# Patient Record
Sex: MALE | Race: WHITE | URBAN - METROPOLITAN AREA
[De-identification: names, ages, dates, MRNs, and addresses within clinical notes are randomized per-mention and may not be internally consistent; named-entity substitution may affect disease eponyms.]

---

## 2023-12-05 ENCOUNTER — OFFICE VISIT (OUTPATIENT)
Dept: OTOLARYNGOLOGY | Facility: CLINIC | Age: 38
End: 2023-12-05

## 2023-12-05 ENCOUNTER — CLINICAL SUPPORT (OUTPATIENT)
Dept: AUDIOLOGY | Facility: CLINIC | Age: 38
End: 2023-12-05

## 2023-12-05 VITALS — TEMPERATURE: 98.1 F | WEIGHT: 156 LBS | HEIGHT: 69 IN | BODY MASS INDEX: 23.11 KG/M2

## 2023-12-05 DIAGNOSIS — M43.6 NECK STIFFNESS: ICD-10-CM

## 2023-12-05 DIAGNOSIS — R51.9 NONINTRACTABLE HEADACHE, UNSPECIFIED CHRONICITY PATTERN, UNSPECIFIED HEADACHE TYPE: ICD-10-CM

## 2023-12-05 DIAGNOSIS — M54.2 NECK PAIN: ICD-10-CM

## 2023-12-05 DIAGNOSIS — R42 DIZZINESS: Primary | ICD-10-CM

## 2023-12-05 DIAGNOSIS — R42 VERTIGO: Primary | ICD-10-CM

## 2023-12-05 PROCEDURE — 99204 OFFICE O/P NEW MOD 45 MIN: CPT | Performed by: NURSE PRACTITIONER

## 2023-12-05 PROCEDURE — 92557 COMPREHENSIVE HEARING TEST: CPT | Performed by: AUDIOLOGIST

## 2023-12-05 PROCEDURE — 1036F TOBACCO NON-USER: CPT | Performed by: NURSE PRACTITIONER

## 2023-12-05 PROCEDURE — 92550 TYMPANOMETRY & REFLEX THRESH: CPT | Performed by: AUDIOLOGIST

## 2023-12-05 ASSESSMENT — PATIENT HEALTH QUESTIONNAIRE - PHQ9
1. LITTLE INTEREST OR PLEASURE IN DOING THINGS: NOT AT ALL
2. FEELING DOWN, DEPRESSED OR HOPELESS: NOT AT ALL
SUM OF ALL RESPONSES TO PHQ9 QUESTIONS 1 AND 2: 0

## 2023-12-05 NOTE — PROGRESS NOTES
"Subjective   Patient ID: Phillip Terrazas is a 38 y.o. male who presents for Vertigo.  HPI  This patient is referred for evaluation of  episodic  vertiginous  positional dizziness. The patient is  accompanied by his wife. The approximate duration of his complaints is 4 months, but describes having episodes approximately 5 years ago that resolved with in office \"manipulation\" with an outside ENT.  The patient describes his dizziness as spinning, will shaking/dropping, eyes floating sensation.  Symptoms seem to occur upon waking and can cause nausea and vomiting.  When asked about ear pain, headache, phono-photophobia, visual or motion intolerance, sound or pressure induced symptoms, hearing loss, discharge from ear, tinnitus, aural fullness or autophony, the patient admits to headaches (no diagnosed history of migraine but describes a few headaches throughout his lifetime preceded by aura), lifelong motion intolerance, visual intolerance.  He endorses chronic neck pain and stiffness.  He has been seen by a chiropractor.  Patient and family report x-ray showing arthritis of the cervical spine.  He is not sure if seeing the chiropractor helped his dizziness or not.  Patient is unsure of how long he would experience vertigo because he does not remain in 1 position.  When the vertigo is at its worst, any type of movement triggers it.    When asked about a significant past otological history including history of prior ear surgery, noise exposure, exposure to ototoxic drugs or agents, and/or family history of hearing loss, the patient admits to occupational noise exposure.    Review of Systems  A comprehensive or 10 points review of the patient´s constitutional, neurological, HEENT, pulmonary, cardiovascular and genito-urinary systems showed only those mentioned in history of present illness.    Objective   Physical Exam  Constitutional: no fever, chills, weight loss or weight gain   General appearance: Appears well, " well-nourished, well groomed. No acute distress.   Communication: Normal communication   Psychiatric: Oriented to person, place and time. Normal mood and affect.   Neurologic: Cranial nerves II-XII grossly intact and symmetric bilaterally.   Head and Face:   Head: Atraumatic with no masses, lesions or scarring.   Face: Normal symmetry, no paralysis, synkinesis or facial tic. No scars or deformities.   TMJ: Normal, no trismus.   Eyes: Conjunctiva not edematous or erythematous   Ears: External inspection of ears with no deformity, scars or masses. Bilateral EACs clear and bilateral TMs intact with no signs of effusions     Neck: Normal appearing, symmetric, trachea midline.   Cardiovascular: Examination of peripheral vascular system shows no clubbing or cyanosis.   Respiratory: No respiratory distress increased work of breathing. Inspection of the chest with symmetric chest expansion and normal respiratory effort.   Skin: No rashes in the head or neck  Bedside occulomotor function assessment for ocular pursuits and saccades, spontaneous nystagmus was normal.  Head thrust negative bilaterally  Romberg unsteady, patient swaying backward but able to compensate  Fukuda normal  Tandem gait normal  [place]-Hallpike negative bilaterally    My interpretation of the audiogram done today is normal hearing with excellent word recognition scores and normal tympanograms bilaterally.    Assessment/Plan     This patient presents for initial evaluation of chronic acquired vertigo, neck pain, neck stiffness, headache.     Reassurance given that otologic exam and audiogram today are both normal.  The physiology of balance control was explained. The likely possible etiologies were reviewed. I believe the patient may have a peripheral vestibular disorder.  We discussed that his symptoms could be caused by BPPV, but his description is more severe than typically seen with BPPV.  He may have a combination of BPPV and migraine making his  symptoms more severe.  We also discussed the possibility of cervicogenic dizziness.  I recommended further evaluation with VNG, vHIT, VEMP testing.  I am happy to discuss those results over the phone after complete.  I also recommended dietary modifications for migraine and referral to neurology.  Patient was given a handout on dietary modifications for migraine as well as BPPV which includes home Epley maneuver.  Patient and family are in agreement with the plan.  All questions were answered to patient and family's satisfaction.      45 minutes was spent on this patient´s visit. More than 50% of that time was spent in counseling regarding the possible etiologies, test results, treatment options and coordinating care.    This note was created using speech recognition transcription software. Despite proofreading, several typographical errors might be present that might affect the meaning of the content. Please call with any questions.

## 2023-12-05 NOTE — PROGRESS NOTES
Chief Complaint   Patient presents with    Dizziness       HISTORY:  Phillip Terrazas, age 38 years, was seen for audiogram in conjunction with otolaryngology appointment on 12/5/2023.  Mr. Terrazas is accompanied to the appointment by his wife.  He states he has been experiencing dizziness for the past five years.  The most recent episode was one month ago.  The notes dizziness with positional movements.  He denies hearing loss, tinnitus, ear pain, ear pressure, middle ear pathology and ear surgery.  He does have a history of noise exposure.    RESULTS:  Prior to testing both external auditory canals were clear and tympanic membranes visualized    Immittance and acoustic reflexes:  Immittance testing yielded TYPE A tympanograms indicating normal middle ear function right ear  Immittance testing yielded TYPE A tympanograms indicating normal middle ear function left ear  Acoustic reflexes were present 500 - 4000 Hz right ear  Acoustic reflexes were present 500 - 4000 Hz left ear    Audiogram:  Normal hearing levels were obtained 250 - 8000 Hz both ears  Speech reception thresholds obtained at 5 dBHL both ears  Speech discrimination scores were 100% at 40 dBHL    Distortion product otoacoustic emissions:  Robust emissions were obtained 4000 -8000 Hz both ears and absent 2000 - 3000 Hz both ears    IMPRESSIONS:  Normal middle ear function noted both ears  Normal acoustic reflexes noted both ears  Normal hearing levels     RECOMMENDATIONS:  1.  Follow up with otolaryngology  2.  Retest hearing levels annually      time: 934 - 950

## 2024-08-07 ENCOUNTER — APPOINTMENT (OUTPATIENT)
Dept: PRIMARY CARE | Facility: CLINIC | Age: 39
End: 2024-08-07

## 2024-08-12 ENCOUNTER — APPOINTMENT (OUTPATIENT)
Dept: PRIMARY CARE | Facility: CLINIC | Age: 39
End: 2024-08-12

## 2024-08-12 VITALS
BODY MASS INDEX: 22.96 KG/M2 | HEIGHT: 69 IN | WEIGHT: 155 LBS | DIASTOLIC BLOOD PRESSURE: 65 MMHG | RESPIRATION RATE: 14 BRPM | HEART RATE: 68 BPM | SYSTOLIC BLOOD PRESSURE: 112 MMHG

## 2024-08-12 DIAGNOSIS — Z00.00 ROUTINE MEDICAL EXAM: Primary | ICD-10-CM

## 2024-08-12 PROCEDURE — 99386 PREV VISIT NEW AGE 40-64: CPT | Performed by: FAMILY MEDICINE

## 2024-08-12 PROCEDURE — 1036F TOBACCO NON-USER: CPT | Performed by: FAMILY MEDICINE

## 2024-08-12 PROCEDURE — 3008F BODY MASS INDEX DOCD: CPT | Performed by: FAMILY MEDICINE

## 2024-08-12 NOTE — PROGRESS NOTES
pt has regular dental visits. no vision problems. no hearing loss.   Lifestyle: healthy diet. no weight concerns. Pt exercises regularly.   He does not use tobacco. He denies alcohol abuse.   Reproductive health: the patient is sexually active.   Safety elements used: seat belt, safe driving habits and smoke detector.   no passive smoke exposure,  chemical abuse, domestic violence, occ anxiety symptoms, no depression symptoms, pt has safe sexual behavior and safe driving habits, no driving violations, no history of DUI and no tuberculosis exposure.      Review of Systems  Constitutional: no chills, no fever and no night sweats.   Eyes: no blurred vision and no eyesight problems.   ENT: no hearing loss, no nasal congestion, no nasal discharge, no hoarseness and no sore throat.   Neck: no mass(es) and no swelling.   Cardiovascular: no chest pain, no intermittent leg claudication, no lower extremity edema, no palpitations and no syncope.   Respiratory: no cough, no shortness of breath during exertion, no shortness of breath at rest and no wheezing.   Gastrointestinal: no abdominal pain, no constipation, no blood in stools, no diarrhea, no melena, no nausea, no rectal pain and no vomiting.   Genitourinary: no dysuria, no change in urinary frequency, no genital lesions, no hematuria, no urinary hesitancy, no incontinence, no nocturia, no local lump, no sexual dysfunction, no testicular pain and no feelings of urinary urgency.   Musculoskeletal: no arthralgias, no back pain, no localized joint pain and no myalgias.   Integumentary: no new skin lesions, no rashes and no skin wound.   Neurological: no difficulty walking, no headache, no limb weakness, no numbness and no tingling.   Psychiatric: no anxiety, no personality change, no depression, no emotional problems, no homicidal thoughts, no anhedonia, no sleep disturbances and no substance use disorders.   Endocrine: no changes in appetite, no deepening of the voice, no  polyuria, no feelings of weakness, no recent weight gain and no recent weight loss.   Hematologic/Lymphatic: no tendency for easy bleeding, no tendency for easy bruising, no recurrent infections and no swollen glands.     Physical Exam  Constitutional: Alert and in no acute distress. Well developed, well nourished.   Head and Face: Head and face: Normal.  Palpation of the face and sinuses: Normal.    Eyes: Normal external exam. Pupils: PERRL and EOMI. Ophthalmoscopic examination: Normal.    Ears, Nose, Mouth, and Throat: External inspection of ears and nose: Normal.  Hearing: Normal.  Nasal mucosa, septum, and turbinates: Normal.  Oropharynx: Normal.    Neck: No neck mass was observed. Supple. Thyroid not enlarged and there were no palpable thyroid nodules.   Cardiovascular: Heart rate and rhythm were normal, normal S1 and S2, no gallops, no murmurs and no pericardial rub. Pedal pulses: Normal. No peripheral edema.   Pulmonary: No respiratory distress. Clear bilateral breath sounds.   Chest wall: Normal.    Abdomen: Soft nontender; no abdominal mass palpated. No organomegaly. No hernias.   Musculoskeletal: Gait and station: Normal. No joint swelling seen, normal movements of all extremities. Range of motion: Normal.  Muscle strength/tone: Normal.    Skin: Normal skin color and pigmentation, normal skin turgor, and no rash. Palpation of skin and subcutaneous tissue: Normal.    Neurologic: Cranial nerves 2-12 grossly intact. Deep tendon reflexes were 2+ and symmetric. Sensation: Normal. Coordination: Normal.    Psychiatric: Judgment and insight: Intact. Alert and oriented x 3. Recent and remote memory: Normal.  Mood and affect: Normal.   Lymphatic: No cervical lymphadenopathy. Palpation of lymph nodes in axillae: Normal.      Unremarkable PE,  Recommend DASH diet and regular exercise. check CBC, CMP, lipid, TSH.  Advise eye exam by an OD yearly and dental exam every 6 months. will monitor lipid and weight yearly.  Recommend safe driving. Recommend sunscreen application if exposed to the sun when UV index is above 2.  recommend Hep C, hep B, HIV test. recommend  Tdap, hepB,  flu, covid  shot. We will call pt regarding lab results. f/u as scheduled.

## 2024-08-13 ENCOUNTER — LAB (OUTPATIENT)
Dept: LAB | Facility: LAB | Age: 39
End: 2024-08-13
Payer: COMMERCIAL

## 2024-08-13 DIAGNOSIS — Z00.00 ROUTINE MEDICAL EXAM: ICD-10-CM

## 2024-08-13 LAB
ALBUMIN SERPL BCP-MCNC: 4.8 G/DL (ref 3.4–5)
ALP SERPL-CCNC: 55 U/L (ref 33–120)
ALT SERPL W P-5'-P-CCNC: 17 U/L (ref 10–52)
ANION GAP SERPL CALC-SCNC: 11 MMOL/L (ref 10–20)
AST SERPL W P-5'-P-CCNC: 18 U/L (ref 9–39)
BILIRUB SERPL-MCNC: 0.8 MG/DL (ref 0–1.2)
BUN SERPL-MCNC: 16 MG/DL (ref 6–23)
CALCIUM SERPL-MCNC: 9.4 MG/DL (ref 8.6–10.3)
CHLORIDE SERPL-SCNC: 103 MMOL/L (ref 98–107)
CHOLEST SERPL-MCNC: 161 MG/DL (ref 0–199)
CHOLESTEROL/HDL RATIO: 3
CO2 SERPL-SCNC: 31 MMOL/L (ref 21–32)
CREAT SERPL-MCNC: 0.87 MG/DL (ref 0.5–1.3)
EGFRCR SERPLBLD CKD-EPI 2021: >90 ML/MIN/1.73M*2
ERYTHROCYTE [DISTWIDTH] IN BLOOD BY AUTOMATED COUNT: 11.9 % (ref 11.5–14.5)
EST. AVERAGE GLUCOSE BLD GHB EST-MCNC: 97 MG/DL
GLUCOSE SERPL-MCNC: 100 MG/DL (ref 74–99)
HBA1C MFR BLD: 5 %
HCT VFR BLD AUTO: 47.2 % (ref 41–52)
HDLC SERPL-MCNC: 53.2 MG/DL
HGB BLD-MCNC: 15.9 G/DL (ref 13.5–17.5)
LDLC SERPL CALC-MCNC: 98 MG/DL
MCH RBC QN AUTO: 29.9 PG (ref 26–34)
MCHC RBC AUTO-ENTMCNC: 33.7 G/DL (ref 32–36)
MCV RBC AUTO: 89 FL (ref 80–100)
NON HDL CHOLESTEROL: 108 MG/DL (ref 0–149)
NRBC BLD-RTO: 0 /100 WBCS (ref 0–0)
PLATELET # BLD AUTO: 263 X10*3/UL (ref 150–450)
POTASSIUM SERPL-SCNC: 4.6 MMOL/L (ref 3.5–5.3)
PROT SERPL-MCNC: 6.8 G/DL (ref 6.4–8.2)
RBC # BLD AUTO: 5.32 X10*6/UL (ref 4.5–5.9)
SODIUM SERPL-SCNC: 140 MMOL/L (ref 136–145)
TRIGL SERPL-MCNC: 51 MG/DL (ref 0–149)
TSH SERPL-ACNC: 1.15 MIU/L (ref 0.44–3.98)
VLDL: 10 MG/DL (ref 0–40)
WBC # BLD AUTO: 5.9 X10*3/UL (ref 4.4–11.3)

## 2024-08-13 PROCEDURE — 80053 COMPREHEN METABOLIC PANEL: CPT

## 2024-08-13 PROCEDURE — 36415 COLL VENOUS BLD VENIPUNCTURE: CPT

## 2024-08-13 PROCEDURE — 83036 HEMOGLOBIN GLYCOSYLATED A1C: CPT

## 2024-08-13 PROCEDURE — 80061 LIPID PANEL: CPT

## 2024-08-13 PROCEDURE — 84443 ASSAY THYROID STIM HORMONE: CPT

## 2024-08-13 PROCEDURE — 85027 COMPLETE CBC AUTOMATED: CPT

## 2024-10-24 ENCOUNTER — TELEPHONE (OUTPATIENT)
Dept: PRIMARY CARE | Facility: CLINIC | Age: 39
End: 2024-10-24
Payer: COMMERCIAL

## 2024-10-24 DIAGNOSIS — R42 DIZZINESS: Primary | ICD-10-CM

## 2024-10-24 RX ORDER — MECLIZINE HYDROCHLORIDE 25 MG/1
25 TABLET ORAL 3 TIMES DAILY PRN
Qty: 15 TABLET | Refills: 0 | Status: SHIPPED | OUTPATIENT
Start: 2024-10-24 | End: 2025-10-24

## 2024-10-28 ENCOUNTER — APPOINTMENT (OUTPATIENT)
Dept: PRIMARY CARE | Facility: CLINIC | Age: 39
End: 2024-10-28
Payer: COMMERCIAL

## 2024-10-28 VITALS — SYSTOLIC BLOOD PRESSURE: 100 MMHG | DIASTOLIC BLOOD PRESSURE: 62 MMHG

## 2024-10-28 DIAGNOSIS — R42 DIZZINESS: Primary | ICD-10-CM

## 2024-10-28 PROCEDURE — 99213 OFFICE O/P EST LOW 20 MIN: CPT | Performed by: FAMILY MEDICINE

## 2024-12-05 ENCOUNTER — APPOINTMENT (OUTPATIENT)
Dept: OTOLARYNGOLOGY | Facility: CLINIC | Age: 39
End: 2024-12-05
Payer: COMMERCIAL

## 2025-02-17 ENCOUNTER — APPOINTMENT (OUTPATIENT)
Dept: PRIMARY CARE | Facility: CLINIC | Age: 40
End: 2025-02-17
Payer: COMMERCIAL

## 2025-02-17 VITALS — SYSTOLIC BLOOD PRESSURE: 122 MMHG | DIASTOLIC BLOOD PRESSURE: 67 MMHG

## 2025-02-17 DIAGNOSIS — M54.2 NECK PAIN: Primary | ICD-10-CM

## 2025-02-17 PROCEDURE — 99213 OFFICE O/P EST LOW 20 MIN: CPT | Performed by: FAMILY MEDICINE

## 2025-02-17 RX ORDER — GABAPENTIN 300 MG/1
300 CAPSULE ORAL NIGHTLY
Qty: 30 CAPSULE | Refills: 1 | Status: SHIPPED | OUTPATIENT
Start: 2025-02-17 | End: 2025-08-16

## 2025-02-17 NOTE — PROGRESS NOTES
Subjective   Patient ID: Phillip Terrazas is a 40 y.o. male who presents for neck pain for many years    HPI   Chronic lneck dull pain was worse lately.  the pain  radiated down to RUE. pt had  BL UE weakness and  numbness. pt controlled BM and bladder well.  no imbalance or falls.     Review of Systems    Objective   /67     Physical Exam  No distress, well groomed, No sclera icterus. normal respiration, lungs: CTA b/l, heart: RRR, mild posterior neck tenderness, decreased strength and sensation at rt hand and to a lesser extent the left hand, good balance  Assessment/Plan   Assessment & Plan  Neck pain  Refer to ortho for eval  Orders:    gabapentin (Neurontin) 300 mg capsule; Take 1 capsule (300 mg) by mouth once daily at bedtime.    Referral to Orthopaedic Surgery; Future

## 2025-03-11 ENCOUNTER — APPOINTMENT (OUTPATIENT)
Dept: ORTHOPEDIC SURGERY | Facility: CLINIC | Age: 40
End: 2025-03-11
Payer: COMMERCIAL

## 2025-03-11 DIAGNOSIS — M54.2 NECK PAIN: ICD-10-CM

## 2025-03-11 DIAGNOSIS — M54.12 CERVICAL RADICULOPATHY: Primary | ICD-10-CM

## 2025-03-11 PROCEDURE — 99203 OFFICE O/P NEW LOW 30 MIN: CPT | Performed by: PHYSICIAN ASSISTANT

## 2025-03-12 NOTE — PROGRESS NOTES
Chief Complaint: Chronic neck pain    HPI: Phillip Terrazas is a 40 y.o. male patient presenting with years of chronic neck pain.  He has pain rating into his right shoulder blade.  He does have pain radiating further down his arms, mostly right with intermittent left arm pain.  He has numbness and tingling in his hands, right worse than left.  He has noticed a recent change in his balance, he feels unsteady when walking.  His handwriting has worsened, he is having difficulty with fine motor skills, and he is dropping things more frequently.  He denies bowel or bladder incontinence.    He has been dealing with vertigo for years, he tried chiropractic care which worsened his neck pain.      No anticoagulations  Negative tobacco use    Review of Systems    All other systems have been reviewed and are negative for complaint. All pertinent positive and negative as listed in history of present illness.    History reviewed. No pertinent past medical history.     History reviewed. No pertinent surgical history.     No Known Allergies     Current Outpatient Medications on File Prior to Visit   Medication Sig Dispense Refill    gabapentin (Neurontin) 300 mg capsule Take 1 capsule (300 mg) by mouth once daily at bedtime. 30 capsule 1    meclizine (Antivert) 25 mg tablet Take 1 tablet (25 mg) by mouth 3 times a day as needed for dizziness. 15 tablet 0     No current facility-administered medications on file prior to visit.            PE:   Const: Well-appearing, well-nourished male in no distress.  Eyes: Normal appearing sclera and conjunctiva, no jaundice, pupils normal in appearance.  Neck: No masses or lymphadenopathy appreciated.  Resp: breathing comfortably, normal respiratory rate rate.  CV: No upper or lower extremity edema.  Musculoskeletal: Normal gait.  Able to heel/toe walk without difficulty.  Cervical ROM is supple.  Strength exam of the upper extremities reveals 5/5 strength in all major muscle groups.  No  intrinsic wasting.  Neuro: Sensation is intact and equal bilaterally. Deep tendon reflexes are normal and symmetric.  No clonus, negative Ndiaye sign, negative Lhermitte's sign  Skin: Intact without any lesions, normal turgor.  Psych: Alert and oriented x3, normal mood and affect.          A/P:     He has subjective symptoms of myelopathy without objective findings on exam.  The plan is to start conservative treatment for his chronic neck pain with radiculopathy.  A referral for physical therapy was provided today.  If he does not have improvement with 6 weeks of physical therapy he should follow-up with me at which time we will consider an MRI of the cervical spine.    **This note was dictated using speech recognition software and was not corrected for spelling or grammatical errors**

## 2025-03-20 ENCOUNTER — EVALUATION (OUTPATIENT)
Dept: PHYSICAL THERAPY | Facility: CLINIC | Age: 40
End: 2025-03-20
Payer: COMMERCIAL

## 2025-03-20 DIAGNOSIS — M54.12 CERVICAL RADICULOPATHY: ICD-10-CM

## 2025-03-20 DIAGNOSIS — M54.2 NECK PAIN: Primary | ICD-10-CM

## 2025-03-20 PROCEDURE — 97012 MECHANICAL TRACTION THERAPY: CPT | Mod: GP

## 2025-03-20 PROCEDURE — 97162 PT EVAL MOD COMPLEX 30 MIN: CPT | Mod: GP

## 2025-03-20 ASSESSMENT — ENCOUNTER SYMPTOMS
OCCASIONAL FEELINGS OF UNSTEADINESS: 0
DEPRESSION: 0
LOSS OF SENSATION IN FEET: 0

## 2025-03-20 ASSESSMENT — PATIENT HEALTH QUESTIONNAIRE - PHQ9
2. FEELING DOWN, DEPRESSED OR HOPELESS: NOT AT ALL
SUM OF ALL RESPONSES TO PHQ9 QUESTIONS 1 AND 2: 0
1. LITTLE INTEREST OR PLEASURE IN DOING THINGS: NOT AT ALL

## 2025-03-20 ASSESSMENT — PAIN - FUNCTIONAL ASSESSMENT: PAIN_FUNCTIONAL_ASSESSMENT: 0-10

## 2025-03-20 ASSESSMENT — PAIN SCALES - GENERAL: PAINLEVEL_OUTOF10: 3

## 2025-03-20 NOTE — PROGRESS NOTES
Physical Therapy    Physical Therapy Evaluation and Treatment      Patient Name: Phillip Terrazas  MRN: [id number]  Today's Date: 3/20/2025  : 1985    Time Entry:   Time Calculation  Start Time: 1530  Stop Time: 1617  Time Calculation (min): 47 min  PT Evaluation Time Entry  PT Evaluation (Moderate) Time Entry: 25    Visit # 1       PT Modalities Time Entry  Mechanical Traction Time Entry: 12           Non-Billable Time  Non-billable time: 10  Non-billable time reason: 10' HP cervical    Assessment:  PT Assessment  PT Assessment Results: Decreased strength, Decreased range of motion, Decreased mobility, Pain  Rehab Prognosis: Good   The patient is a 40 year old male presenting to PT with neck pain, intermittent UE radicular symptoms and cervical ROM deficits.  He will benefit from skilled intervention to address above deficits and progress toward meeting goals.    Plan:  OP PT Plan  Treatment/Interventions: Cryotherapy, Dry needling, Education/ Instruction, Electrical stimulation, Hot pack, Manual therapy, Mechanical traction, Therapeutic exercises  PT Plan: Skilled PT  PT Frequency: 2 times per week  Number of Treatments Authorized: $25 co-pay  Rehab Potential: Good  Plan of Care Agreement: Patient    Current Problem:   1. Neck pain  Referral to Physical Therapy    Follow Up In Physical Therapy      2. Cervical radiculopathy  Referral to Physical Therapy          Subjective    Name &  confirmed by patient.  General:  General  Reason for Referral: neck pain; cervical radiculopathy  Referred By: Carlos GRAY  Patient reports he has been experiencing neck pain for ~1 year NKI.  He states pain can radiate to right scapula & down R UE.  He has noticed R UE weakness over the past year as well.  Cervical FB exacerbates pain.  Pain ranges from 0-7/10.  Will call to schedule follow up with ortho following PT.  The patient's goals are to decrease pain and regain strength/mobility.      Precautions:  Precautions  JESSICA  "Fall Risk Score (The score of 4 or more indicates an increased risk of falling): 0  Precautions Comment: none     Pain:  Pain Assessment  Pain Assessment: 0-10  0-10 (Numeric) Pain Score: 3  Pain Location: Neck     Prior Level of Function:  Prior Function Per Pt/Caregiver Report  Level of [place]: Independent with ADLs and functional transfers  Hand Dominance: Right    Objective   Cervical AROM  FB - 50  BB - unable d/t dizziness  R SB - 24  L SB - 18  R Rot - 55  L Rot - 65     strength (kg force)  R - 74  L - 68    R UE strength  Flexion - 4+/5  Abduction - 4+/5  IR - 4+/5  ER - 4+/5  Biceps - 4+/5  Triceps - 4+/5    Outcome Measures:  Other Measures  Neck Disability Index: 14     Treatments:  Cervical HP - 10'   Cervical mechanical traction - 12'; 11#/0#; 30\"H/10\"R    Goals: (By week 6)  1) Decrease neck pain/UE radicular symptoms to <2/10    2) Patient will demonstrate pain free/full cervical AROM to ease capability to perform daily & work activities    3) Increase R UE strength to 5/5 for lifting heavier objects at work    AMBETTER AUTHORIZATION FORM    Cause of therapy: UNSPECIFIED  Is this surgical?: NON SURGICAL  Is the rehabilitation related to a diagnosis of cancer?: NO  Is the rehab related to a diagnosis of lymphadema?: No  How many body parts: One  head/neck  Number of visits requested? 8  Select the following statement that best describes the patient's presentation: MILD OBJECTIVE AND FUNCTIONAL DEFICITS: SPORADIC SYMPTOMS WITH MINIMAL LOSS OF RANGE OF MOTION, STRENGTH, OR ABILITY TO PERFORM DAILY TASKS.               "

## 2025-04-01 ENCOUNTER — TREATMENT (OUTPATIENT)
Dept: PHYSICAL THERAPY | Facility: CLINIC | Age: 40
End: 2025-04-01
Payer: COMMERCIAL

## 2025-04-01 DIAGNOSIS — M54.2 NECK PAIN: Primary | ICD-10-CM

## 2025-04-01 PROCEDURE — 97012 MECHANICAL TRACTION THERAPY: CPT | Mod: GP

## 2025-04-01 PROCEDURE — 97110 THERAPEUTIC EXERCISES: CPT | Mod: GP

## 2025-04-01 ASSESSMENT — PAIN SCALES - GENERAL: PAINLEVEL_OUTOF10: 4

## 2025-04-01 ASSESSMENT — PAIN - FUNCTIONAL ASSESSMENT: PAIN_FUNCTIONAL_ASSESSMENT: 0-10

## 2025-04-01 NOTE — PROGRESS NOTES
"Physical Therapy    Physical Therapy Treatment      Patient Name: Phillip Terrazas  MRN: [id number]  Today's Date: 2025  : 1985    Time Entry:   Time Calculation  Start Time: 161  Stop Time: 1655  Time Calculation (min): 40 min       Visit # 2    PT Therapeutic Procedures Time Entry  Therapeutic Exercise Time Entry: 15  PT Modalities Time Entry  Mechanical Traction Time Entry: 15           Non-Billable Time  Non-billable time: 10  Non-billable time reason: 10' HP cervical prior to treatment    Assessment:    Patient reports pain decreased to 1/10 following treatment today.      Plan:   Treatment/Interventions: Cryotherapy, Dry needling, Education/ Instruction, Electrical stimulation, Hot pack, Manual therapy, Mechanical traction, Therapeutic exercises     Current Problem:   1. Neck pain  Follow Up In Physical Therapy          Subjective    Name &  confirmed by patient.  General:   Patient reports feeling good for the rest of the day after last treatment.      Precautions:  Precautions  Precautions Comment: none     Pain:  Pain Assessment  Pain Assessment: 0-10  0-10 (Numeric) Pain Score: 4  Pain Location: Neck          Objective     R UE strength  Flexion - 4+/5  Abduction - 4+/5  IR - 4+/5  ER - 4+/5  Biceps - 4+/5  Triceps - 4+/5    Outcome Measures:    Not today    Treatments:  EXERCISES Date 25 Date Date Date    REPS REPS REPS REPS          HP cervical/R UT 10'             Chin tucks X10 10\"H      Cervical SB stretch X3 ea 30\"H             Pulldowns Blue x 20      Rows  Blue x 20      Skis Blue x 20                                                                                                        Cervical mechanical traction ; 15'; 12#/0#; 30\"H/10\"R                                                HEP                 Goals: (By week 6)  1) Decrease neck pain/UE radicular symptoms to <2/10 -progressing    2) Patient will demonstrate pain free/full cervical AROM to ease capability to perform daily " Didn't feel self Wednesday. 100.6 Thursday night. Seen at  Urgent care yesterday. Fever at 101 today, lethargic. Drinking ok. Low appetite.    & work activities    3) Increase R UE strength to 5/5 for lifting heavier objects at work    AMBETTER AUTHORIZATION FORM    Cause of therapy: UNSPECIFIED  Is this surgical?: NON SURGICAL  Is the rehabilitation related to a diagnosis of cancer?: NO  Is the rehab related to a diagnosis of lymphadema?: No  How many body parts: One  head/neck  Number of visits requested? 8  Select the following statement that best describes the patient's presentation: MILD OBJECTIVE AND FUNCTIONAL DEFICITS: SPORADIC SYMPTOMS WITH MINIMAL LOSS OF RANGE OF MOTION, STRENGTH, OR ABILITY TO PERFORM DAILY TASKS.

## 2025-04-03 ENCOUNTER — TREATMENT (OUTPATIENT)
Dept: PHYSICAL THERAPY | Facility: CLINIC | Age: 40
End: 2025-04-03
Payer: COMMERCIAL

## 2025-04-03 DIAGNOSIS — M54.2 NECK PAIN: Primary | ICD-10-CM

## 2025-04-03 PROCEDURE — 97110 THERAPEUTIC EXERCISES: CPT | Mod: GP

## 2025-04-03 PROCEDURE — 97012 MECHANICAL TRACTION THERAPY: CPT | Mod: GP

## 2025-04-03 ASSESSMENT — PAIN SCALES - GENERAL: PAINLEVEL_OUTOF10: 6

## 2025-04-03 ASSESSMENT — PAIN - FUNCTIONAL ASSESSMENT: PAIN_FUNCTIONAL_ASSESSMENT: 0-10

## 2025-04-03 NOTE — PROGRESS NOTES
"Physical Therapy    Physical Therapy Treatment      Patient Name: Phillip Terrazas  MRN: [id number]  Today's Date: 4/3/2025  : 1985    Time Entry:   Time Calculation  Start Time: 1750  Stop Time: 1830  Time Calculation (min): 40 min       Visit # 3    PT Therapeutic Procedures Time Entry  Therapeutic Exercise Time Entry: 25  PT Modalities Time Entry  Mechanical Traction Time Entry: 15                Assessment:    Patient 5 minutes late to PT.  Patient reports pain decreased to 3/10 following treatment.        Plan:   Treatment/Interventions: Cryotherapy, Dry needling, Education/ Instruction, Electrical stimulation, Hot pack, Manual therapy, Mechanical traction, Therapeutic exercises     Current Problem:   1. Neck pain  Follow Up In Physical Therapy            Subjective    Name &  confirmed by patient.  General:   Patient reports experiencing less frequent R UE radicular symptoms.      Precautions:  Precautions  Precautions Comment: none     Pain:  Pain Assessment  Pain Assessment: 0-10  0-10 (Numeric) Pain Score: 6  Pain Location: Neck          Objective     R UE strength  Flexion - 4+/5  Abduction - 4+/5    Outcome Measures:    Not today    Treatments:  EXERCISES Date 25 Date 4/3/25 Date Date    REPS REPS REPS REPS          HP cervical/R UT 10' ---            Chin tucks X10 10\"H X10 10\"H     Cervical SB stretch X3 ea 30\"H X3 ea 30\" H            Pulldowns Blue x 20 Gray x 20     Rows  Blue x 20 Briscoe x 20     Skis Blue x 20 Briscoe x 20            PRIMUS ROM  T-114 60\" ea     PRIMUS H P/P  T-201 60\" ea                                                                                  Cervical mechanical traction ; 15'; 12#/0#; 30\"H/10\"R 15'; 13#/0#; 30\"H/10\"R                                               HEP                 Goals: (By week 6)  1) Decrease neck pain/UE radicular symptoms to <2/10 -progressing    2) Patient will demonstrate pain free/full cervical AROM to ease capability to perform daily & work " activities    3) Increase R UE strength to 5/5 for lifting heavier objects at work    AMBETTER AUTHORIZATION FORM    Cause of therapy: UNSPECIFIED  Is this surgical?: NON SURGICAL  Is the rehabilitation related to a diagnosis of cancer?: NO  Is the rehab related to a diagnosis of lymphadema?: No  How many body parts: One  head/neck  Number of visits requested? 8  Select the following statement that best describes the patient's presentation: MILD OBJECTIVE AND FUNCTIONAL DEFICITS: SPORADIC SYMPTOMS WITH MINIMAL LOSS OF RANGE OF MOTION, STRENGTH, OR ABILITY TO PERFORM DAILY TASKS.

## 2025-04-08 ENCOUNTER — TREATMENT (OUTPATIENT)
Dept: PHYSICAL THERAPY | Facility: CLINIC | Age: 40
End: 2025-04-08
Payer: COMMERCIAL

## 2025-04-08 DIAGNOSIS — M54.2 NECK PAIN: Primary | ICD-10-CM

## 2025-04-08 PROCEDURE — 97012 MECHANICAL TRACTION THERAPY: CPT | Mod: GP

## 2025-04-08 PROCEDURE — 97110 THERAPEUTIC EXERCISES: CPT | Mod: GP

## 2025-04-08 ASSESSMENT — PAIN SCALES - GENERAL: PAINLEVEL_OUTOF10: 6

## 2025-04-08 ASSESSMENT — PAIN - FUNCTIONAL ASSESSMENT: PAIN_FUNCTIONAL_ASSESSMENT: 0-10

## 2025-04-08 NOTE — PROGRESS NOTES
"Physical Therapy    Physical Therapy Treatment      Patient Name: Phillip Terrazas  MRN: [id number]  Today's Date: 2025  : 1985    Time Entry:   Time Calculation  Start Time: 161  Stop Time: 1655  Time Calculation (min): 40 min       Visit # 4    PT Therapeutic Procedures Time Entry  Therapeutic Exercise Time Entry: 25  PT Modalities Time Entry  Mechanical Traction Time Entry: 15                Assessment:   Patient reports neck pain decreased to 3/10 following treatment.  Added cervical rotation stretch to HEP.      Plan:   Treatment/Interventions: Cryotherapy, Dry needling, Education/ Instruction, Electrical stimulation, Hot pack, Manual therapy, Mechanical traction, Therapeutic exercises     Current Problem:   1. Neck pain  Follow Up In Physical Therapy          Subjective    Name &  confirmed by patient.  General:  Patient reports experiencing no UE radicular symptoms today.      Precautions:  Precautions  Precautions Comment: none     Pain:  Pain Assessment  Pain Assessment: 0-10  0-10 (Numeric) Pain Score: 6  Pain Location: Neck        Objective     R UE strength  Flexion - 4+/5  Abduction - 4+/5    Outcome Measures:    Not today    Treatments:  EXERCISES Date 25 Date 4/3/25 Date 25 Date    REPS REPS REPS REPS          HP cervical/R UT 10' ---            Chin tucks X10 10\"H X10 10\"H X10 10\"H    Cervical SB stretch X3 ea 30\"H X3 ea 30\"H X3 ea 30\"H    Cervical rotation stretch   X3 ea 30\"H    Pulldowns Blue x 20 Gray x 20 Gray x 20    Rows  Blue x 20 Gray x 20 Briscoe x 20    Skis Blue x 20 Briscoe x 20 Gray x 20           PRIMUS ROM  T-114 60\" ea T-114 75\" ea    PRIMUS H P/P  T-201 60\" ea T-201 75\" ea    PRIMUS Gripper   T-69 60\" ea                                                                          Cervical mechanical traction ; 15'; 12#/0#; 30\"H/10\"R 15'; 13#/0#; 30\"H/10\"R 15'; 13#/0#; 30\"H/10\"R                                              HEP                 Goals: (By week 6)  1) Decrease " neck pain/UE radicular symptoms to <2/10 -progressing    2) Patient will demonstrate pain free/full cervical AROM to ease capability to perform daily & work activities    3) Increase R UE strength to 5/5 for lifting heavier objects at work    AMBETTER AUTHORIZATION FORM    Cause of therapy: UNSPECIFIED  Is this surgical?: NON SURGICAL  Is the rehabilitation related to a diagnosis of cancer?: NO  Is the rehab related to a diagnosis of lymphadema?: No  How many body parts: One  head/neck  Number of visits requested? 8  Select the following statement that best describes the patient's presentation: MILD OBJECTIVE AND FUNCTIONAL DEFICITS: SPORADIC SYMPTOMS WITH MINIMAL LOSS OF RANGE OF MOTION, STRENGTH, OR ABILITY TO PERFORM DAILY TASKS.

## 2025-04-10 ENCOUNTER — TREATMENT (OUTPATIENT)
Dept: PHYSICAL THERAPY | Facility: CLINIC | Age: 40
End: 2025-04-10
Payer: COMMERCIAL

## 2025-04-10 DIAGNOSIS — M54.2 NECK PAIN: ICD-10-CM

## 2025-04-10 PROCEDURE — 97110 THERAPEUTIC EXERCISES: CPT | Mod: GP,CQ

## 2025-04-10 PROCEDURE — 97012 MECHANICAL TRACTION THERAPY: CPT | Mod: GP,CQ

## 2025-04-10 ASSESSMENT — PAIN - FUNCTIONAL ASSESSMENT: PAIN_FUNCTIONAL_ASSESSMENT: 0-10

## 2025-04-10 ASSESSMENT — PAIN SCALES - GENERAL: PAINLEVEL_OUTOF10: 6

## 2025-04-10 NOTE — PROGRESS NOTES
"Physical Therapy    Physical Therapy Treatment      Patient Name: Phillip Terrazas  MRN: [id number]  Today's Date: 4/10/2025  : 1985    Time Entry:   Time Calculation  Start Time: 0500  Stop Time: 0540  Time Calculation (min): 40 min       Visit # 5    PT Therapeutic Procedures Time Entry  Therapeutic Exercise Time Entry: 25  PT Modalities Time Entry  Mechanical Traction Time Entry: 15                Assessment:   Pt had decreased neck pain 3-4/10 and tightness at the end of his session.       Plan:   Treatment/Interventions: Cryotherapy, Dry needling, Education/ Instruction, Electrical stimulation, Hot pack, Manual therapy, Mechanical traction, Therapeutic exercises     Current Problem:   1. Neck pain  Follow Up In Physical Therapy          Subjective    Name &  confirmed by patient.  General:  Pt reports increased neck and thoracic pain today. Unsure of cause.    Precautions:   Precautions  Precautions Comment: none     Pain:  Pain Assessment  Pain Assessment: 0-10  0-10 (Numeric) Pain Score: 6  Pain Location: Neck        Objective   R UE strength  Flexion - 4+/5  Abduction - 4+/5    Added doorway pec stretch    Outcome Measures:    Not today    Treatments:  EXERCISES Date 25 Date 4/3/25 Date 25 Date 4/10/25    REPS REPS REPS REPS          HP cervical/R UT 10' ---            Chin tucks X10 10\"H X10 10\"H X10 10\"H X10 10\"H   Cervical SB stretch X3 ea 30\"H X3 ea 30\"H X3 ea 30\"H X3 ea 30\"H   Cervical rotation stretch   X3 ea 30\"H X3 ea 30\"H   Pulldowns Blue x 20 Gray x 20 Gray x 20 Gray x 20   Rows  Blue x 20 Gray x 20 Gray x 20 Briscoe x 20   Skis Blue x 20 Gray x 20 Gray x 20 Gray x 20          PRIMUS ROM  T-114 60\" ea T-114 75\" ea T-114 75\" ea   PRIMUS H P/P  T-201 60\" ea T-201 75\" ea T-201 75\" ea   PRIMUS Gripper   T-69 60\" ea T-69 75\" ea          Door way pec stretch    30\"Hx3                                                           Cervical mechanical traction ; 15'; 12#/0#; 30\"H/10\"R 15'; 13#/0#; " "30\"H/10\"R 15'; 13#/0#; 30\"H/10\"R 15'; 13#/0#; 30\"H/10\"R                                             HEP             Goals: (By week 6)  1) Decrease neck pain/UE radicular symptoms to <2/10 -progressing    2) Patient will demonstrate pain free/full cervical AROM to ease capability to perform daily & work activities    3) Increase R UE strength to 5/5 for lifting heavier objects at work    AMBETTER AUTHORIZATION FORM    Cause of therapy: UNSPECIFIED  Is this surgical?: NON SURGICAL  Is the rehabilitation related to a diagnosis of cancer?: NO  Is the rehab related to a diagnosis of lymphadema?: No  How many body parts: One  head/neck  Number of visits requested? 8  Select the following statement that best describes the patient's presentation: MILD OBJECTIVE AND FUNCTIONAL DEFICITS: SPORADIC SYMPTOMS WITH MINIMAL LOSS OF RANGE OF MOTION, STRENGTH, OR ABILITY TO PERFORM DAILY TASKS.               "

## 2025-04-15 ENCOUNTER — TREATMENT (OUTPATIENT)
Dept: PHYSICAL THERAPY | Facility: CLINIC | Age: 40
End: 2025-04-15
Payer: COMMERCIAL

## 2025-04-15 DIAGNOSIS — M54.2 NECK PAIN: Primary | ICD-10-CM

## 2025-04-15 PROCEDURE — 97110 THERAPEUTIC EXERCISES: CPT | Mod: GP

## 2025-04-15 PROCEDURE — 97012 MECHANICAL TRACTION THERAPY: CPT | Mod: GP

## 2025-04-15 ASSESSMENT — PAIN - FUNCTIONAL ASSESSMENT: PAIN_FUNCTIONAL_ASSESSMENT: 0-10

## 2025-04-15 ASSESSMENT — PAIN SCALES - GENERAL: PAINLEVEL_OUTOF10: 0 - NO PAIN

## 2025-04-15 NOTE — PROGRESS NOTES
"Physical Therapy    Physical Therapy Treatment      Patient Name: Phillip Terrazas  MRN: [id number]  Today's Date: 4/15/2025  : 1985    Time Entry:   Time Calculation  Start Time: 1700  Stop Time: 1750  Time Calculation (min): 50 min       Visit # 6    PT Therapeutic Procedures Time Entry  Therapeutic Exercise Time Entry: 25  PT Modalities Time Entry  Mechanical Traction Time Entry: 15           Non-Billable Time  Non-billable time: 10  Non-billable time reason: 10' cervical HP prior to treatment    Assessment:   Patient reports experiencing no pain before or after treatment.  He states he can experience pain with end range cervical rotation.        Plan:   Treatment/Interventions: Cryotherapy, Dry needling, Education/ Instruction, Electrical stimulation, Hot pack, Manual therapy, Mechanical traction, Therapeutic exercises     Current Problem:   1. Neck pain  Follow Up In Physical Therapy          Subjective    Name &  confirmed by patient.  General:  Patient reports experiencing no pain at rest today.      Precautions:  Precautions  Precautions Comment: none     Pain:  Pain Assessment  Pain Assessment: 0-10  0-10 (Numeric) Pain Score: 0 - No pain        Objective   R UE strength  Flexion - 4+/5  Abduction - 4+/5    Outcome Measures:    Not today    Treatments:  EXERCISES Date 25 Date 4/3/25 Date 25 Date 4/10/25 4/15/25    REPS REPS REPS REPS REPS           HP cervical/R UT 10' ---   10'           Chin tucks X10 10\"H X10 10\"H X10 10\"H X10 10\"H X10 10\"H   Cervical SB stretch X3 ea 30\"H X3 ea 30\"H X3 ea 30\"H X3 ea 30\"H X3 ea 30\"H   Cervical rotation stretch   X3 ea 30\"H X3 ea 30\"H X3 ea 30\"H   Pulldowns Blue x 20 Gray x 20 Gray x 20 Gray x 20 Gray x 20   Rows  Blue x 20 Gray x 20 Gray x 20 Gray x 20 Briscoe x 20   Skis Blue x 20 Gray x 20 Gray x 20 Gray x 20 Gray x 20           PRIMUS ROM  T-114 60\" ea T-114 75\" ea T-114 75\" ea T-114 75\" ea   PRIMUS H P/P  T-201 60\" ea T-201 75\" ea T-201 75\" ea T-201 75\" ea " "  PRIMUS Gripper   T-69 60\" ea T-69 75\" ea T-69 75\" ea           Door way pec stretch    30\"Hx3 30\"H x 3                                                                   Cervical mechanical traction ; 15'; 12#/0#; 30\"H/10\"R 15'; 13#/0#; 30\"H/10\"R 15'; 13#/0#; 30\"H/10\"R 15'; 13#/0#; 30\"H/10\"R 15'; 13#/0#; 30\"H/10\"R                                                   HEP              Goals: (By week 6)  1) Decrease neck pain/UE radicular symptoms to <2/10 -progressing    2) Patient will demonstrate pain free/full cervical AROM to ease capability to perform daily & work activities    3) Increase R UE strength to 5/5 for lifting heavier objects at work    AMBETTER AUTHORIZATION FORM    Cause of therapy: UNSPECIFIED  Is this surgical?: NON SURGICAL  Is the rehabilitation related to a diagnosis of cancer?: NO  Is the rehab related to a diagnosis of lymphadema?: No  How many body parts: One  head/neck  Number of visits requested? 8  Select the following statement that best describes the patient's presentation: MILD OBJECTIVE AND FUNCTIONAL DEFICITS: SPORADIC SYMPTOMS WITH MINIMAL LOSS OF RANGE OF MOTION, STRENGTH, OR ABILITY TO PERFORM DAILY TASKS.               "

## 2025-04-17 ENCOUNTER — APPOINTMENT (OUTPATIENT)
Dept: PHYSICAL THERAPY | Facility: CLINIC | Age: 40
End: 2025-04-17
Payer: COMMERCIAL

## 2025-04-17 ENCOUNTER — DOCUMENTATION (OUTPATIENT)
Dept: PHYSICAL THERAPY | Facility: CLINIC | Age: 40
End: 2025-04-17
Payer: COMMERCIAL

## 2025-04-17 NOTE — PROGRESS NOTES
Physical Therapy                 Therapy Communication Note    Patient Name: Phillip Terrazas  MRN: [id number]  Department:   Room: Room/bed info not found  Today's Date: 4/17/2025     Discipline: Physical Therapy          Missed Visit Reason:      Missed Time: Cancel    Comment: Pt canceled via my chart. Wants to reschedule appointment.

## 2025-04-22 ENCOUNTER — TREATMENT (OUTPATIENT)
Dept: PHYSICAL THERAPY | Facility: CLINIC | Age: 40
End: 2025-04-22
Payer: COMMERCIAL

## 2025-04-22 DIAGNOSIS — M54.2 NECK PAIN: Primary | ICD-10-CM

## 2025-04-22 PROCEDURE — 97012 MECHANICAL TRACTION THERAPY: CPT | Mod: GP

## 2025-04-22 PROCEDURE — 97110 THERAPEUTIC EXERCISES: CPT | Mod: GP

## 2025-04-22 ASSESSMENT — PAIN - FUNCTIONAL ASSESSMENT: PAIN_FUNCTIONAL_ASSESSMENT: 0-10

## 2025-04-22 ASSESSMENT — PAIN SCALES - GENERAL: PAINLEVEL_OUTOF10: 4

## 2025-04-22 NOTE — PROGRESS NOTES
"Physical Therapy    Physical Therapy Treatment      Patient Name: Phillip Terrazas  MRN: [id number]  Today's Date: 2025  : 1985    Time Entry:   Time Calculation  Start Time: 1700  Stop Time: 1750  Time Calculation (min): 50 min       Visit # 7 (7/10)    PT Therapeutic Procedures Time Entry  Therapeutic Exercise Time Entry: 25  PT Modalities Time Entry  Mechanical Traction Time Entry: 15           Non-Billable Time  Non-billable time: 10  Non-billable time reason: 10' cervical HP prior to treatment    Assessment:   Patient reports pain decreased to 3/10 following treatment.        Plan:   Treatment/Interventions: Cryotherapy, Dry needling, Education/ Instruction, Electrical stimulation, Hot pack, Manual therapy, Mechanical traction, Therapeutic exercises     Current Problem:   1. Neck pain  Follow Up In Physical Therapy        Subjective    Name &  confirmed by patient.  General:  Patient reports experiencing no UE radicular symptoms currently.    Precautions:  Precautions  Precautions Comment: none     Pain:  Pain Assessment  Pain Assessment: 0-10  0-10 (Numeric) Pain Score: 4  Pain Location: Neck        Objective   R UE strength  Flexion - 4+/5    Outcome Measures:    Not today    Treatments:  EXERCISES Date 25 Date 4/3/25 Date 25 Date 4/10/25 4/15/25 4/22/25    REPS REPS REPS REPS REPS REPS            HP cervical/R UT 10' ---   10' 10'            Chin tucks X10 10\"H X10 10\"H X10 10\"H X10 10\"H X10 10\"H X10 10\"H   Cervical SB stretch X3 ea 30\"H X3 ea 30\"H X3 ea 30\"H X3 ea 30\"H X3 ea 30\"H X3 ea 30\"H   Cervical rotation stretch   X3 ea 30\"H X3 ea 30\"H X3 ea 30\"H X3 ea 30\"H   Pulldowns Blue x 20 Gray x 20 Gray x 20 Gray x 20 Gray x 20 Gray x 20   Rows  Blue x 20 Gray x 20 Gray x 20 Gray x 20 Gray x 20 Briscoe x 20   Skis Blue x 20 Gray x 20 Gray x 20 Gray x 20 Gray x 20 Gray x 20            PRIMUS ROM  T-114 60\" ea T-114 75\" ea T-114 75\" ea T-114 75\" ea T-114 75\" ea   PRIMUS H P/P  T-201 60\" ea T-201 " "75\" ea T-201 75\" ea T-201 75\" ea T-201 100\" ea   PRIMUS Gripper   T-69 60\" ea T-69 75\" ea T-69 75\" ea T-69 75\" ea            Door way pec stretch    30\"Hx3 30\"H x 3 30\"H x 3                                                                           Cervical mechanical traction ; 15'; 12#/0#; 30\"H/10\"R 15'; 13#/0#; 30\"H/10\"R 15'; 13#/0#; 30\"H/10\"R 15'; 13#/0#; 30\"H/10\"R 15'; 13#/0#; 30\"H/10\"R 15'; 13#/0#; 30\"H/10\"R                                                         HEP               Goals: (By week 6)  1) Decrease neck pain/UE radicular symptoms to <2/10 -progressing    2) Patient will demonstrate pain free/full cervical AROM to ease capability to perform daily & work activities    3) Increase R UE strength to 5/5 for lifting heavier objects at work    AMBETTER AUTHORIZATION FORM    Cause of therapy: UNSPECIFIED  Is this surgical?: NON SURGICAL  Is the rehabilitation related to a diagnosis of cancer?: NO  Is the rehab related to a diagnosis of lymphadema?: No  How many body parts: One  head/neck  Number of visits requested? 8  Select the following statement that best describes the patient's presentation: MILD OBJECTIVE AND FUNCTIONAL DEFICITS: SPORADIC SYMPTOMS WITH MINIMAL LOSS OF RANGE OF MOTION, STRENGTH, OR ABILITY TO PERFORM DAILY TASKS.               "

## 2025-04-24 ENCOUNTER — DOCUMENTATION (OUTPATIENT)
Dept: PHYSICAL THERAPY | Facility: CLINIC | Age: 40
End: 2025-04-24
Payer: COMMERCIAL

## 2025-04-24 ENCOUNTER — APPOINTMENT (OUTPATIENT)
Dept: PHYSICAL THERAPY | Facility: CLINIC | Age: 40
End: 2025-04-24
Payer: COMMERCIAL

## 2025-04-24 DIAGNOSIS — M54.2 NECK PAIN: Primary | ICD-10-CM

## 2025-04-24 NOTE — PROGRESS NOTES
Physical Therapy                 Therapy Communication Note    Patient Name: Phillip Terrazas  MRN: [id number]  Department: [address]  Room: Room/bed info not found  Today's Date: 4/24/2025     Discipline: Physical Therapy          Missed Visit Reason:  spoke with patient and decided to cancel today d/t experiencing dizziness.      Missed Time: Cancel

## 2025-04-30 ENCOUNTER — TREATMENT (OUTPATIENT)
Dept: PHYSICAL THERAPY | Facility: CLINIC | Age: 40
End: 2025-04-30
Payer: COMMERCIAL

## 2025-04-30 DIAGNOSIS — M54.2 NECK PAIN: ICD-10-CM

## 2025-04-30 PROCEDURE — 97110 THERAPEUTIC EXERCISES: CPT | Mod: GP,CQ | Performed by: PHYSICAL THERAPY ASSISTANT

## 2025-04-30 ASSESSMENT — PAIN SCALES - GENERAL: PAINLEVEL_OUTOF10: 6

## 2025-04-30 ASSESSMENT — PAIN - FUNCTIONAL ASSESSMENT: PAIN_FUNCTIONAL_ASSESSMENT: 0-10

## 2025-04-30 NOTE — PROGRESS NOTES
"Physical Therapy    Physical Therapy Treatment      Patient Name: Phillip Terrazas  MRN: [id number]  Today's Date: 2025  : 1985    Time Entry:   Time Calculation  Start Time: 1500  Stop Time: 1551  Time Calculation (min): 51 min       Visit # 8 (8/10)    PT Therapeutic Procedures Time Entry  Therapeutic Exercise Time Entry: 41  PT Modalities Time Entry  Hot/Cold Pack Time Entry: 10                Assessment:   Patient reports pain decreased to 3/10 following treatment.  Reports no issue with vertigo throughout session.       Plan:   Treatment/Interventions: Cryotherapy, Dry needling, Education/ Instruction, Electrical stimulation, Hot pack, Manual therapy, Mechanical traction, Therapeutic exercises     Current Problem:   1. Neck pain  Follow Up In Physical Therapy        Subjective    Name &  confirmed by patient.  General:  Patient reports he kim experienced a bout of vertigo and is still trying to overcome the symptoms. Will hold cervical tractions this date as patient reports he can't lie flat. Reports has been sleeping upright.     Precautions:  Precautions  Precautions Comment: none     Pain:  Pain Assessment  Pain Assessment: 0-10  0-10 (Numeric) Pain Score: 6  Pain Location: Neck        Objective   R UE strength  Flexion - 4+/5    Outcome Measures:    Not today    Treatments:  EXERCISES Date 4/10/25 4/15/25 4/22/25 4/30/25    REPS REPS REPS           HP cervical/R UT  10' 10' 10'          Chin tucks X10 10\"H X10 10\"H X10 10\"H X10 10\"H   Cervical SB stretch X3 ea 30\"H X3 ea 30\"H X3 ea 30\"H X3 ea 30\"H   Cervical rotation stretch X3 ea 30\"H X3 ea 30\"H X3 ea 30\"H X3 ea 30\"H   Pulldowns Gray x 20 Gray x 20 Gray x 20 Briscoe x 20   Rows  Gray x 20 Gray x 20 Gray x 20 Briscoe x 20   Skis Briscoe x 20 Briscoe x 20 Gray x 20 Gray x 20          PRIMUS ROM T-114 75\" ea T-114 75\" ea T-114 75\" ea T-114 75\" ea   PRIMUS H P/P T-201 75\" ea T-201 75\" ea T-201 100\" ea T-201 100\" ea   PRIMUS Gripper T-69 75\" ea T-69 75\"  " "75\" ea T-69 75\" ea          Door way pec stretch 30\"Hx3 30\"H x 3 30\"H x 3 30\"H x 3                                                           Cervical mechanical traction ; 15'; 13#/0#; 30\"H/10\"R 15'; 13#/0#; 30\"H/10\"R 15'; 13#/0#; 30\"H/10\"R Hold due to vertigo                                             HEP             Goals: (By week 6)  1) Decrease neck pain/UE radicular symptoms to <2/10 -progressing    2) Patient will demonstrate pain free/full cervical AROM to ease capability to perform daily & work activities    3) Increase R UE strength to 5/5 for lifting heavier objects at work    AMBETTER AUTHORIZATION FORM    Cause of therapy: UNSPECIFIED  Is this surgical?: NON SURGICAL  Is the rehabilitation related to a diagnosis of cancer?: NO  Is the rehab related to a diagnosis of lymphadema?: No  How many body parts: One  head/neck  Number of visits requested? 8  Select the following statement that best describes the patient's presentation: MILD OBJECTIVE AND FUNCTIONAL DEFICITS: SPORADIC SYMPTOMS WITH MINIMAL LOSS OF RANGE OF MOTION, STRENGTH, OR ABILITY TO PERFORM DAILY TASKS.               "

## 2025-05-06 ENCOUNTER — TREATMENT (OUTPATIENT)
Dept: PHYSICAL THERAPY | Facility: CLINIC | Age: 40
End: 2025-05-06
Payer: COMMERCIAL

## 2025-05-06 DIAGNOSIS — M54.2 NECK PAIN: Primary | ICD-10-CM

## 2025-05-06 PROCEDURE — 97110 THERAPEUTIC EXERCISES: CPT | Mod: GP

## 2025-05-06 PROCEDURE — 97012 MECHANICAL TRACTION THERAPY: CPT | Mod: GP

## 2025-05-06 ASSESSMENT — PAIN SCALES - GENERAL: PAINLEVEL_OUTOF10: 4

## 2025-05-06 ASSESSMENT — PAIN - FUNCTIONAL ASSESSMENT: PAIN_FUNCTIONAL_ASSESSMENT: 0-10

## 2025-05-06 NOTE — PROGRESS NOTES
"Physical Therapy    Physical Therapy Treatment      Patient Name: Phillip Terrazas  MRN: [id number]  Today's Date: 2025  : 1985    Time Entry:   Time Calculation  Start Time: 1530  Stop Time: 1625  Time Calculation (min): 55 min       Visit # 9 (9/10)    PT Therapeutic Procedures Time Entry  Therapeutic Exercise Time Entry: 30  PT Modalities Time Entry  Mechanical Traction Time Entry: 15           Non-Billable Time  Non-billable time: 10  Non-billable time reason: 10' cervical HP    Assessment:   Patient was able to tolerate traction & perform all exercises without experiencing increases in pain or vertigo symptoms.        Plan:   Treatment/Interventions: Cryotherapy, Dry needling, Education/ Instruction, Electrical stimulation, Hot pack, Manual therapy, Mechanical traction, Therapeutic exercises     Current Problem:   1. Neck pain  Follow Up In Physical Therapy          Subjective    Name &  confirmed by patient.  General:  Patient reports vertigo symptoms have improved since last visit.      Precautions:  Precautions  Precautions Comment: none     Pain:  Pain Assessment  Pain Assessment: 0-10  0-10 (Numeric) Pain Score: 4  Pain Location: Neck        Objective   R UE strength  Flexion - 4+/5    Outcome Measures:    Not today    Treatments:  EXERCISES Date 4/10/25 4/15/25 4/22/25 4/30/25 5/6/25    REPS REPS REPS             HP cervical/R UT  10' 10' 10' Cervical HP 10'           Chin tucks X10 10\"H X10 10\"H X10 10\"H X10 10\"H X10 10\"H   Cervical SB stretch X3 ea 30\"H X3 ea 30\"H X3 ea 30\"H X3 ea 30\"H X3 ea 30\"H   Cervical rotation stretch X3 ea 30\"H X3 ea 30\"H X3 ea 30\"H X3 ea 30\"H X3 ea 30\"H   Pulldowns Gray x 20 Gray x 20 Gray x 20 Gray x 20 Briscoe x 20   Rows  Gray x 20 Gray x 20 Gray x 20 Gray x 20 Brisoce x 20   Skis Briscoe x 20 Briscoe x 20 Gray x 20 Gray x 20 Gray x 20           PRIMUS ROM T-114 75\" ea T-114 75\" ea T-114 75\" ea T-114 75\" ea T-114 75\" ea   PRIMUS H P/P T-201 75\" ea T-201 75\" ea T-201 100\" ea " "T-201 100\" ea T-201 100\" ea   PRIMUS Gripper T-69 75\" ea T-69 75\" ea T-69 75\" ea T-69 75\" ea T-69 75\"           Door way pec stretch 30\"Hx3 30\"H x 3 30\"H x 3 30\"H x 3 X3 30\"H                                                                   Cervical mechanical traction ; 15'; 13#/0#; 30\"H/10\"R 15'; 13#/0#; 30\"H/10\"R 15'; 13#/0#; 30\"H/10\"R Hold due to vertigo 15'; 13#/0#; 30\"H/10\"R                                                   HEP              Goals: (By week 6)  1) Decrease neck pain/UE radicular symptoms to <2/10 -progressing    2) Patient will demonstrate pain free/full cervical AROM to ease capability to perform daily & work activities    3) Increase R UE strength to 5/5 for lifting heavier objects at work    AMBETTER AUTHORIZATION FORM    Cause of therapy: UNSPECIFIED  Is this surgical?: NON SURGICAL  Is the rehabilitation related to a diagnosis of cancer?: NO  Is the rehab related to a diagnosis of lymphadema?: No  How many body parts: One  head/neck  Number of visits requested? 8  Select the following statement that best describes the patient's presentation: MILD OBJECTIVE AND FUNCTIONAL DEFICITS: SPORADIC SYMPTOMS WITH MINIMAL LOSS OF RANGE OF MOTION, STRENGTH, OR ABILITY TO PERFORM DAILY TASKS.               "

## 2025-05-09 ENCOUNTER — TREATMENT (OUTPATIENT)
Dept: PHYSICAL THERAPY | Facility: CLINIC | Age: 40
End: 2025-05-09
Payer: COMMERCIAL

## 2025-05-09 DIAGNOSIS — M54.2 NECK PAIN: Primary | ICD-10-CM

## 2025-05-09 PROCEDURE — 97012 MECHANICAL TRACTION THERAPY: CPT | Mod: GP

## 2025-05-09 PROCEDURE — 97110 THERAPEUTIC EXERCISES: CPT | Mod: GP

## 2025-05-09 ASSESSMENT — PAIN - FUNCTIONAL ASSESSMENT: PAIN_FUNCTIONAL_ASSESSMENT: 0-10

## 2025-05-09 ASSESSMENT — PAIN SCALES - GENERAL: PAINLEVEL_OUTOF10: 3

## 2025-05-09 NOTE — PROGRESS NOTES
"Physical Therapy    Physical Therapy Treatment (Discharge)     Patient Name: Phillip Terrazas  MRN: [id number]  Today's Date: 2025  : 1985    Time Entry:   Time Calculation  Start Time: 1530  Stop Time: 1625  Time Calculation (min): 55 min       Visit # 10 (10/10)    PT Therapeutic Procedures Time Entry  Therapeutic Exercise Time Entry: 30  PT Modalities Time Entry  Mechanical Traction Time Entry: 15           Non-Billable Time  Non-billable time: 10  Non-billable time reason: 10' cervical HP prior to treatment    Assessment:   Patient has exhausted current authorized visits and will continue with HEP at this time.        Plan:   Discharge from PT today.  Thank you for your referral.    Current Problem:   1. Neck pain  Follow Up In Physical Therapy          Subjective    Name &  confirmed by patient.  General:  Patient reports he has not experienced right UE radicular symptoms for a few weeks.      Precautions:  Precautions  Precautions Comment: none     Pain:  Pain Assessment  Pain Assessment: 0-10  0-10 (Numeric) Pain Score: 3  Pain Location: Neck        Objective   Cervical AROM  FB - 40  R SB - 32  L SB - 20  R Rot - 65  L Rot - 72    R UE strength  Flexion - 5/5  Abduction - 5/5  IR - 5/5  ER - 5/5  Biceps - 5/5  Triceps - 5/5    Outcome Measures:  Other Measures  Neck Disability Index: 11     Treatments:  EXERCISES Date 4/10/25 4/15/25 4/22/25 4/30/25 5/6/25 5/9/25    REPS REPS REPS               HP cervical/R UT  10' 10' 10' Cervical HP 10' Cervical HP 10'         Discharge 15'   Chin tucks X10 10\"H X10 10\"H X10 10\"H X10 10\"H X10 10\"H    Cervical SB stretch X3 ea 30\"H X3 ea 30\"H X3 ea 30\"H X3 ea 30\"H X3 ea 30\"H    Cervical rotation stretch X3 ea 30\"H X3 ea 30\"H X3 ea 30\"H X3 ea 30\"H X3 ea 30\"H    Pulldowns Gray x 20 Gray x 20 Gray x 20 Gray x 20 Gray x 20 Briscoe x 20   Rows  Gray x 20 Gray x 20 Gray x 20 Gray x 20 Gray x 20 Briscoe x 20   Skis Briscoe x 20 Briscoe x 20 Gray x 20 Gray x 20 Gray x 20 Gray x 20 " "           PRIMUS ROM T-114 75\" ea T-114 75\" ea T-114 75\" ea T-114 75\" ea T-114 75\" ea T-114 75\" ea   PRIMUS H P/P T-201 75\" ea T-201 75\" ea T-201 100\" ea T-201 100\" ea T-201 100\" ea T-201 100\" ea   PRIMUS Gripper T-69 75\" ea T-69 75\" ea T-69 75\" ea T-69 75\" ea T-69 75\" T-69 75\" ea            Door way pec stretch 30\"Hx3 30\"H x 3 30\"H x 3 30\"H x 3 X3 30\"H                                                                            Cervical mechanical traction ; 15'; 13#/0#; 30\"H/10\"R 15'; 13#/0#; 30\"H/10\"R 15'; 13#/0#; 30\"H/10\"R Hold due to vertigo 15'; 13#/0#; 30\"H/10\"R 15'; 13#/0#; 30\"H/10\"R                                                         HEP               Goals: (By week 6)  1) Decrease neck pain/UE radicular symptoms to <2/10 -progressing    2) Patient will demonstrate pain free/full cervical AROM to ease capability to perform daily & work activities    3) Increase R UE strength to 5/5 for lifting heavier objects at work    AMBETTER AUTHORIZATION FORM    Cause of therapy: UNSPECIFIED  Is this surgical?: NON SURGICAL  Is the rehabilitation related to a diagnosis of cancer?: NO  Is the rehab related to a diagnosis of lymphadema?: No  How many body parts: One  head/neck  Number of visits requested? 8  Select the following statement that best describes the patient's presentation: MILD OBJECTIVE AND FUNCTIONAL DEFICITS: SPORADIC SYMPTOMS WITH MINIMAL LOSS OF RANGE OF MOTION, STRENGTH, OR ABILITY TO PERFORM DAILY TASKS.                    "